# Patient Record
Sex: MALE | Race: BLACK OR AFRICAN AMERICAN | Employment: OTHER | URBAN - METROPOLITAN AREA
[De-identification: names, ages, dates, MRNs, and addresses within clinical notes are randomized per-mention and may not be internally consistent; named-entity substitution may affect disease eponyms.]

---

## 2019-10-17 ENCOUNTER — OFFICE VISIT (OUTPATIENT)
Dept: FAMILY MEDICINE CLINIC | Age: 44
End: 2019-10-17

## 2019-10-17 ENCOUNTER — HOSPITAL ENCOUNTER (OUTPATIENT)
Dept: GENERAL RADIOLOGY | Age: 44
Discharge: HOME | End: 2019-10-17
Payer: OTHER GOVERNMENT

## 2019-10-17 VITALS
TEMPERATURE: 98 F | WEIGHT: 257.4 LBS | SYSTOLIC BLOOD PRESSURE: 124 MMHG | OXYGEN SATURATION: 97 % | HEART RATE: 60 BPM | BODY MASS INDEX: 36.85 KG/M2 | DIASTOLIC BLOOD PRESSURE: 74 MMHG | RESPIRATION RATE: 16 BRPM | HEIGHT: 70 IN

## 2019-10-17 DIAGNOSIS — G89.29 CHRONIC BILATERAL LOW BACK PAIN WITHOUT SCIATICA: ICD-10-CM

## 2019-10-17 DIAGNOSIS — M54.50 CHRONIC BILATERAL LOW BACK PAIN WITHOUT SCIATICA: ICD-10-CM

## 2019-10-17 DIAGNOSIS — M54.50 CHRONIC BILATERAL LOW BACK PAIN WITHOUT SCIATICA: Primary | ICD-10-CM

## 2019-10-17 DIAGNOSIS — G89.29 CHRONIC BILATERAL LOW BACK PAIN WITHOUT SCIATICA: Primary | ICD-10-CM

## 2019-10-17 DIAGNOSIS — E66.01 SEVERE OBESITY (HCC): ICD-10-CM

## 2019-10-17 PROCEDURE — 72110 X-RAY EXAM L-2 SPINE 4/>VWS: CPT

## 2019-10-17 NOTE — PROGRESS NOTES
Nirmal Fuller, 38 y.o.,  male    SUBJECTIVE  Low back pain x 3 months    Pt reports bilateral low back pain, PS 4/10, usually after prolonged walking. He has mild numbness from her bilateral knees down through the legs at times. No weakness, incontinence, previous trauma. Says he was prescribed muscle relaxant and naprosyn from his initial ER visit 3 months ago at [facility]. Says muscle relaxant was constipating. He says he is able to continue mountain biking can go up  to 40 miles runs without problems. He is receiving routine care from the [facility], says ongoing evaluation with holter monitoring for episodic palpitations. He has strong h/o CV disease, CHF, CAD. ROS:  See HPI, all others negative        Patient Active Problem List   Diagnosis Code    Severe obesity ([address] E66.01       Current Outpatient Medications   Medication Sig Dispense Refill    APPLE CIDER VINEGAR PO Take  by mouth.          No Known Allergies    Past Medical History:   Diagnosis Date    Heart problem        Social History     Socioeconomic History    Marital status:      Spouse name: Not on file    Number of children: Not on file    Years of education: Not on file    Highest education level: Not on file   Occupational History    Not on file   Social Needs    Financial resource strain: Not on file    Food insecurity:     Worry: Not on file     Inability: Not on file    Transportation needs:     Medical: Not on file     Non-medical: Not on file   Tobacco Use    Smoking status: Never Smoker    Smokeless tobacco: Never Used   Substance and Sexual Activity    Alcohol use: Yes     Frequency: Monthly or less     Drinks per session: 1 or 2     Binge frequency: Never    Drug use: Never    Sexual activity: Yes     Partners: Female   Lifestyle    Physical activity:     Days per week: Not on file     Minutes per session: Not on file    Stress: Not on file   Relationships    Social connections:     Talks on phone: Not on file     Gets together: Not on file     Attends Yazdanism service: Not on file     Active member of club or organization: Not on file     Attends meetings of clubs or organizations: Not on file     Relationship status: Not on file    Intimate partner violence:     Fear of current or ex partner: Not on file     Emotionally abused: Not on file     Physically abused: Not on file     Forced sexual activity: Not on file   Other Topics Concern    Not on file   Social History Narrative    Not on file       Family History   Problem Relation Age of Onset    Substance Abuse Mother         tobacco use    Hypertension Mother     High Cholesterol Mother     Heart Attack Mother     Heart Failure Mother     Substance Abuse Sister         tobacco use    Heart Attack Sister     Heart Failure Sister     Substance Abuse Brother         tobacco use    Drug Abuse Brother     Heart Attack Brother     Heart Failure Brother     Substance Abuse Brother         tobacco use    Heart Attack Brother     Heart Failure Brother          OBJECTIVE    Physical Exam:     Visit Vitals  /74 (BP 1 Location: Left arm, BP Patient Position: Sitting)   Pulse 60   Temp 98 °F (36.7 °C) (Oral)   Resp 16   Ht 5' 10.25\" (1.784 m)   Wt 257 lb 6.4 oz (116.8 kg)   SpO2 97%   BMI 36.67 kg/m²       General: alert, well-appearing, AA, in no apparent distress or pain  Head: atraumatic.  Non-tender maxillary and frontal sinuses  Eyes: Lids with no discharge, no matting, conjunctivae clear and non injected, full EOMs, PERLLA  Ears: pinna non-tender, external auditory canal patent, TM intact  Mouth/throat:tonsils non enlarged, pharynx non erythematous and no lesion, nasal mucosa normal  Neck: supple, no adenopathy palpated  CVS: normal rate, regular rhythm, distinct S1 and S2  Lungs:clear to ausculation bilaterally, no crackles, wheezing or rhonchi noted  Abdomen: normoactive bowel sounds, soft, non-tender  Extremities: no edema, no cyanosis, MSK grossly normal  Skin: warm, no lesions, rashes noted  Psych:  mood and affect normal        ASSESSMENT/PLAN  Diagnoses and all orders for this visit:    1. Chronic bilateral low back pain without sciatica  Consider lumbar stenosis  HEP provided  -     REFERRAL TO SPINE SURGERY  -     XR SPINE LUMB MIN 4 V; Future  Offered another type of muscle relaxant if he wants to give another try, he is to call us with previous medication that has caused constipation  Offered po steroids/trial gabapentin,  he declines  Cont prn nsaids    2. Severe obesity ([facility] [address]  Encourage weight loss    Follow-up and Dispositions    · Return if symptoms worsen or fail to improve. Patient understands plan of care. Patient has provided input and agrees with goals.

## 2019-10-17 NOTE — PROGRESS NOTES
1. Have you been to the ER, urgent care clinic since your last visit? Hospitalized since your last visit? [facility] ED  for side pain LOV: 7/2019    2. Have you seen or consulted any other health care providers outside of the [address] [place] [address] since your last visit? Include any pap smears or colon screening. No    Chief Complaint   Patient presents with    New Patient    Side Pain     bilat side pain x 3 months     Patient stated he will take flu vaccine with family, so they can get it all at once.